# Patient Record
Sex: FEMALE | Race: WHITE | Employment: PART TIME | URBAN - NONMETROPOLITAN AREA
[De-identification: names, ages, dates, MRNs, and addresses within clinical notes are randomized per-mention and may not be internally consistent; named-entity substitution may affect disease eponyms.]

---

## 2017-01-04 ENCOUNTER — OFFICE VISIT (OUTPATIENT)
Dept: ORTHOPEDIC SURGERY | Age: 22
End: 2017-01-04

## 2017-01-04 VITALS
HEIGHT: 61 IN | HEART RATE: 72 BPM | WEIGHT: 209 LBS | BODY MASS INDEX: 39.46 KG/M2 | SYSTOLIC BLOOD PRESSURE: 142 MMHG | DIASTOLIC BLOOD PRESSURE: 82 MMHG

## 2017-01-04 DIAGNOSIS — M17.11 OSTEOARTHRITIS OF RIGHT PATELLOFEMORAL JOINT: Primary | ICD-10-CM

## 2017-01-04 PROCEDURE — 99213 OFFICE O/P EST LOW 20 MIN: CPT | Performed by: FAMILY MEDICINE

## 2017-01-04 RX ORDER — IBUPROFEN 800 MG/1
800 TABLET ORAL PRN
COMMUNITY
End: 2017-02-15

## 2017-01-05 ENCOUNTER — EVALUATION (OUTPATIENT)
Dept: PHYSICAL THERAPY | Age: 22
End: 2017-01-05

## 2017-01-05 DIAGNOSIS — M17.11 OSTEOARTHRITIS OF RIGHT PATELLOFEMORAL JOINT: Primary | ICD-10-CM

## 2017-01-05 PROCEDURE — 97161 PT EVAL LOW COMPLEX 20 MIN: CPT | Performed by: PHYSICAL THERAPIST

## 2017-01-05 ASSESSMENT — PAIN DESCRIPTION - PAIN TYPE: TYPE: CHRONIC PAIN

## 2017-01-05 ASSESSMENT — PAIN DESCRIPTION - PROGRESSION: CLINICAL_PROGRESSION: GRADUALLY WORSENING

## 2017-01-05 ASSESSMENT — PAIN DESCRIPTION - ORIENTATION: ORIENTATION: RIGHT

## 2017-01-05 ASSESSMENT — PAIN SCALES - GENERAL: PAINLEVEL_OUTOF10: 6

## 2017-01-05 ASSESSMENT — PAIN DESCRIPTION - LOCATION: LOCATION: KNEE

## 2017-01-05 ASSESSMENT — PAIN DESCRIPTION - FREQUENCY: FREQUENCY: CONTINUOUS

## 2017-01-05 ASSESSMENT — PAIN DESCRIPTION - DESCRIPTORS: DESCRIPTORS: ACHING;STABBING;SHARP

## 2017-01-08 PROCEDURE — G8979 MOBILITY GOAL STATUS: HCPCS | Performed by: PHYSICAL THERAPIST

## 2017-01-08 PROCEDURE — G8978 MOBILITY CURRENT STATUS: HCPCS | Performed by: PHYSICAL THERAPIST

## 2017-01-08 ASSESSMENT — PAIN SCALES - GENERAL: PAINLEVEL_OUTOF10: 6

## 2017-01-08 ASSESSMENT — PAIN DESCRIPTION - PAIN TYPE: TYPE: CHRONIC PAIN

## 2017-01-08 ASSESSMENT — PAIN DESCRIPTION - FREQUENCY: FREQUENCY: CONTINUOUS

## 2017-01-08 ASSESSMENT — PAIN DESCRIPTION - ORIENTATION: ORIENTATION: RIGHT

## 2017-01-08 ASSESSMENT — PAIN DESCRIPTION - DESCRIPTORS: DESCRIPTORS: ACHING;STABBING;SHARP

## 2017-01-08 ASSESSMENT — PAIN DESCRIPTION - LOCATION: LOCATION: KNEE

## 2017-01-09 ENCOUNTER — TREATMENT (OUTPATIENT)
Dept: PHYSICAL THERAPY | Age: 22
End: 2017-01-09

## 2017-01-09 DIAGNOSIS — M17.11 OSTEOARTHRITIS OF RIGHT PATELLOFEMORAL JOINT: Primary | ICD-10-CM

## 2017-01-09 PROCEDURE — 97014 ELECTRIC STIMULATION THERAPY: CPT

## 2017-01-09 PROCEDURE — 97110 THERAPEUTIC EXERCISES: CPT

## 2017-01-12 ENCOUNTER — TREATMENT (OUTPATIENT)
Dept: PHYSICAL THERAPY | Age: 22
End: 2017-01-12

## 2017-01-12 DIAGNOSIS — M17.11 OSTEOARTHRITIS OF RIGHT PATELLOFEMORAL JOINT: Primary | ICD-10-CM

## 2017-01-12 PROCEDURE — 97110 THERAPEUTIC EXERCISES: CPT

## 2017-01-12 PROCEDURE — 97014 ELECTRIC STIMULATION THERAPY: CPT

## 2017-01-16 ENCOUNTER — TREATMENT (OUTPATIENT)
Dept: PHYSICAL THERAPY | Age: 22
End: 2017-01-16

## 2017-01-16 DIAGNOSIS — M17.11 OSTEOARTHRITIS OF RIGHT PATELLOFEMORAL JOINT: Primary | ICD-10-CM

## 2017-01-16 PROCEDURE — 97014 ELECTRIC STIMULATION THERAPY: CPT

## 2017-01-16 PROCEDURE — 97110 THERAPEUTIC EXERCISES: CPT

## 2017-01-19 ENCOUNTER — TREATMENT (OUTPATIENT)
Dept: PHYSICAL THERAPY | Age: 22
End: 2017-01-19

## 2017-01-19 DIAGNOSIS — M17.11 OSTEOARTHRITIS OF RIGHT PATELLOFEMORAL JOINT: Primary | ICD-10-CM

## 2017-01-19 PROCEDURE — 97110 THERAPEUTIC EXERCISES: CPT

## 2017-01-19 PROCEDURE — 97014 ELECTRIC STIMULATION THERAPY: CPT

## 2017-01-23 ENCOUNTER — TREATMENT (OUTPATIENT)
Dept: PHYSICAL THERAPY | Age: 22
End: 2017-01-23

## 2017-01-23 DIAGNOSIS — M17.11 OSTEOARTHRITIS OF RIGHT PATELLOFEMORAL JOINT: Primary | ICD-10-CM

## 2017-01-23 PROCEDURE — 97110 THERAPEUTIC EXERCISES: CPT

## 2017-01-23 PROCEDURE — 97014 ELECTRIC STIMULATION THERAPY: CPT

## 2017-01-25 ENCOUNTER — TREATMENT (OUTPATIENT)
Dept: PHYSICAL THERAPY | Age: 22
End: 2017-01-25

## 2017-01-25 DIAGNOSIS — M17.11 OSTEOARTHRITIS OF RIGHT PATELLOFEMORAL JOINT: Primary | ICD-10-CM

## 2017-01-25 PROCEDURE — 97110 THERAPEUTIC EXERCISES: CPT

## 2017-01-25 PROCEDURE — 97014 ELECTRIC STIMULATION THERAPY: CPT

## 2017-01-31 ENCOUNTER — TREATMENT (OUTPATIENT)
Dept: PHYSICAL THERAPY | Age: 22
End: 2017-01-31

## 2017-01-31 DIAGNOSIS — M17.11 OSTEOARTHRITIS OF RIGHT PATELLOFEMORAL JOINT: Primary | ICD-10-CM

## 2017-01-31 PROCEDURE — 97110 THERAPEUTIC EXERCISES: CPT | Performed by: PHYSICAL THERAPIST

## 2017-01-31 PROCEDURE — G8978 MOBILITY CURRENT STATUS: HCPCS | Performed by: PHYSICAL THERAPIST

## 2017-02-02 ENCOUNTER — TREATMENT (OUTPATIENT)
Dept: PHYSICAL THERAPY | Age: 22
End: 2017-02-02

## 2017-02-02 DIAGNOSIS — M17.11 OSTEOARTHRITIS OF RIGHT PATELLOFEMORAL JOINT: Primary | ICD-10-CM

## 2017-02-02 PROCEDURE — 97014 ELECTRIC STIMULATION THERAPY: CPT

## 2017-02-02 PROCEDURE — 97110 THERAPEUTIC EXERCISES: CPT

## 2017-02-05 ENCOUNTER — HOSPITAL ENCOUNTER (EMERGENCY)
Age: 22
Discharge: HOME | End: 2017-02-05
Attending: EMERGENCY MEDICINE
Payer: COMMERCIAL

## 2017-02-05 ENCOUNTER — APPOINTMENT (OUTPATIENT)
Dept: GENERAL RADIOLOGY | Age: 22
End: 2017-02-05
Payer: COMMERCIAL

## 2017-02-05 VITALS
HEART RATE: 92 BPM | BODY MASS INDEX: 37.17 KG/M2 | HEIGHT: 62 IN | TEMPERATURE: 97.7 F | OXYGEN SATURATION: 100 % | SYSTOLIC BLOOD PRESSURE: 128 MMHG | RESPIRATION RATE: 16 BRPM | DIASTOLIC BLOOD PRESSURE: 62 MMHG | WEIGHT: 202 LBS

## 2017-02-05 DIAGNOSIS — M25.561 CHRONIC PAIN OF RIGHT KNEE: Primary | ICD-10-CM

## 2017-02-05 DIAGNOSIS — G89.29 CHRONIC PAIN OF RIGHT KNEE: Primary | ICD-10-CM

## 2017-02-05 LAB
CHP ED QC CHECK: YES
HCG(URINE) PREGNANCY TEST: NEGATIVE
PREGNANCY TEST URINE, POC: NEGATIVE

## 2017-02-05 PROCEDURE — 6360000002 HC RX W HCPCS: Performed by: EMERGENCY MEDICINE

## 2017-02-05 PROCEDURE — 73562 X-RAY EXAM OF KNEE 3: CPT

## 2017-02-05 PROCEDURE — 84703 CHORIONIC GONADOTROPIN ASSAY: CPT

## 2017-02-05 PROCEDURE — 6370000000 HC RX 637 (ALT 250 FOR IP): Performed by: EMERGENCY MEDICINE

## 2017-02-05 PROCEDURE — 96372 THER/PROPH/DIAG INJ SC/IM: CPT

## 2017-02-05 PROCEDURE — 73562 X-RAY EXAM OF KNEE 3: CPT | Performed by: RADIOLOGY

## 2017-02-05 PROCEDURE — 99284 EMERGENCY DEPT VISIT MOD MDM: CPT

## 2017-02-05 RX ORDER — KETOROLAC TROMETHAMINE 30 MG/ML
60 INJECTION, SOLUTION INTRAMUSCULAR; INTRAVENOUS ONCE
Status: COMPLETED | OUTPATIENT
Start: 2017-02-05 | End: 2017-02-05

## 2017-02-05 RX ORDER — OXYCODONE HYDROCHLORIDE AND ACETAMINOPHEN 5; 325 MG/1; MG/1
1 TABLET ORAL ONCE
Status: COMPLETED | OUTPATIENT
Start: 2017-02-05 | End: 2017-02-05

## 2017-02-05 RX ORDER — RANITIDINE 150 MG/1
150 TABLET ORAL 2 TIMES DAILY
COMMUNITY
End: 2017-11-10 | Stop reason: ALTCHOICE

## 2017-02-05 RX ADMIN — KETOROLAC TROMETHAMINE 60 MG: 30 INJECTION, SOLUTION INTRAMUSCULAR at 13:58

## 2017-02-05 RX ADMIN — OXYCODONE HYDROCHLORIDE AND ACETAMINOPHEN 1 TABLET: 5; 325 TABLET ORAL at 15:30

## 2017-02-05 ASSESSMENT — PAIN DESCRIPTION - PAIN TYPE
TYPE: CHRONIC PAIN
TYPE: CHRONIC PAIN

## 2017-02-05 ASSESSMENT — PAIN DESCRIPTION - ONSET: ONSET: ON-GOING

## 2017-02-05 ASSESSMENT — PAIN SCALES - GENERAL
PAINLEVEL_OUTOF10: 9
PAINLEVEL_OUTOF10: 9
PAINLEVEL_OUTOF10: 10

## 2017-02-05 ASSESSMENT — PAIN DESCRIPTION - ORIENTATION
ORIENTATION: RIGHT
ORIENTATION: RIGHT

## 2017-02-05 ASSESSMENT — PAIN DESCRIPTION - FREQUENCY: FREQUENCY: CONTINUOUS

## 2017-02-05 ASSESSMENT — PAIN DESCRIPTION - LOCATION
LOCATION: KNEE
LOCATION: KNEE

## 2017-02-05 ASSESSMENT — PAIN DESCRIPTION - DESCRIPTORS: DESCRIPTORS: SHARP

## 2017-02-05 ASSESSMENT — PAIN DESCRIPTION - PROGRESSION: CLINICAL_PROGRESSION: GRADUALLY WORSENING

## 2017-02-06 ENCOUNTER — OFFICE VISIT (OUTPATIENT)
Dept: PRIMARY CARE CLINIC | Age: 22
End: 2017-02-06

## 2017-02-06 ENCOUNTER — TELEPHONE (OUTPATIENT)
Dept: ORTHOPEDIC SURGERY | Age: 22
End: 2017-02-06

## 2017-02-06 VITALS
DIASTOLIC BLOOD PRESSURE: 74 MMHG | OXYGEN SATURATION: 98 % | BODY MASS INDEX: 38.57 KG/M2 | HEART RATE: 74 BPM | SYSTOLIC BLOOD PRESSURE: 128 MMHG | WEIGHT: 209.6 LBS | HEIGHT: 62 IN | TEMPERATURE: 98.3 F

## 2017-02-06 DIAGNOSIS — M25.561 ACUTE PAIN OF RIGHT KNEE: Primary | ICD-10-CM

## 2017-02-06 PROCEDURE — 99202 OFFICE O/P NEW SF 15 MIN: CPT | Performed by: NURSE PRACTITIONER

## 2017-02-06 ASSESSMENT — ENCOUNTER SYMPTOMS: RESPIRATORY NEGATIVE: 1

## 2017-02-07 ENCOUNTER — TREATMENT (OUTPATIENT)
Dept: PHYSICAL THERAPY | Age: 22
End: 2017-02-07

## 2017-02-07 PROCEDURE — 97014 ELECTRIC STIMULATION THERAPY: CPT | Performed by: PHYSICAL THERAPIST

## 2017-02-07 PROCEDURE — 97110 THERAPEUTIC EXERCISES: CPT | Performed by: PHYSICAL THERAPIST

## 2017-02-15 ENCOUNTER — OFFICE VISIT (OUTPATIENT)
Dept: ORTHOPEDIC SURGERY | Age: 22
End: 2017-02-15

## 2017-02-15 VITALS
DIASTOLIC BLOOD PRESSURE: 76 MMHG | HEIGHT: 62 IN | BODY MASS INDEX: 38.46 KG/M2 | SYSTOLIC BLOOD PRESSURE: 134 MMHG | HEART RATE: 72 BPM | WEIGHT: 209 LBS

## 2017-02-15 DIAGNOSIS — M17.11 OSTEOARTHRITIS OF RIGHT PATELLOFEMORAL JOINT: Primary | ICD-10-CM

## 2017-02-15 DIAGNOSIS — M23.51 RECURRENT KNEE INSTABILITY, RIGHT: ICD-10-CM

## 2017-02-15 PROCEDURE — 99213 OFFICE O/P EST LOW 20 MIN: CPT | Performed by: FAMILY MEDICINE

## 2017-02-15 RX ORDER — NAPROXEN 500 MG/1
500 TABLET ORAL 2 TIMES DAILY WITH MEALS
Qty: 60 TABLET | Refills: 3 | Status: SHIPPED | OUTPATIENT
Start: 2017-02-15 | End: 2017-11-10 | Stop reason: ALTCHOICE

## 2017-02-20 ENCOUNTER — TREATMENT (OUTPATIENT)
Dept: PHYSICAL THERAPY | Age: 22
End: 2017-02-20

## 2017-02-20 DIAGNOSIS — M17.11 OSTEOARTHRITIS OF RIGHT PATELLOFEMORAL JOINT: Primary | ICD-10-CM

## 2017-02-20 PROCEDURE — 97110 THERAPEUTIC EXERCISES: CPT | Performed by: PHYSICAL THERAPIST

## 2017-02-22 ENCOUNTER — TREATMENT (OUTPATIENT)
Dept: PHYSICAL THERAPY | Age: 22
End: 2017-02-22

## 2017-02-22 DIAGNOSIS — M17.11 OSTEOARTHRITIS OF RIGHT PATELLOFEMORAL JOINT: Primary | ICD-10-CM

## 2017-02-22 PROCEDURE — 97110 THERAPEUTIC EXERCISES: CPT | Performed by: PHYSICAL THERAPIST

## 2017-02-27 ENCOUNTER — TREATMENT (OUTPATIENT)
Dept: PHYSICAL THERAPY | Age: 22
End: 2017-02-27

## 2017-02-27 DIAGNOSIS — M17.11 OSTEOARTHRITIS OF RIGHT PATELLOFEMORAL JOINT: Primary | ICD-10-CM

## 2017-02-27 PROCEDURE — 97110 THERAPEUTIC EXERCISES: CPT | Performed by: PHYSICAL THERAPIST

## 2017-03-01 ENCOUNTER — TREATMENT (OUTPATIENT)
Dept: PHYSICAL THERAPY | Age: 22
End: 2017-03-01

## 2017-03-01 DIAGNOSIS — M17.11 OSTEOARTHRITIS OF RIGHT PATELLOFEMORAL JOINT: Primary | ICD-10-CM

## 2017-03-01 PROCEDURE — 97110 THERAPEUTIC EXERCISES: CPT | Performed by: PHYSICAL THERAPIST

## 2017-03-06 ENCOUNTER — TREATMENT (OUTPATIENT)
Dept: PHYSICAL THERAPY | Age: 22
End: 2017-03-06

## 2017-03-06 DIAGNOSIS — M17.11 OSTEOARTHRITIS OF RIGHT PATELLOFEMORAL JOINT: Primary | ICD-10-CM

## 2017-03-06 PROCEDURE — 97110 THERAPEUTIC EXERCISES: CPT | Performed by: PHYSICAL THERAPIST

## 2017-03-08 ENCOUNTER — TREATMENT (OUTPATIENT)
Dept: PHYSICAL THERAPY | Age: 22
End: 2017-03-08

## 2017-03-13 ENCOUNTER — TREATMENT (OUTPATIENT)
Dept: PHYSICAL THERAPY | Age: 22
End: 2017-03-13
Payer: COMMERCIAL

## 2017-03-13 DIAGNOSIS — M17.11 OSTEOARTHRITIS OF RIGHT PATELLOFEMORAL JOINT: Primary | ICD-10-CM

## 2017-03-13 PROCEDURE — 97110 THERAPEUTIC EXERCISES: CPT | Performed by: PHYSICAL THERAPIST

## 2017-03-15 ENCOUNTER — OFFICE VISIT (OUTPATIENT)
Dept: ORTHOPEDIC SURGERY | Age: 22
End: 2017-03-15
Payer: COMMERCIAL

## 2017-03-15 ENCOUNTER — TREATMENT (OUTPATIENT)
Dept: PHYSICAL THERAPY | Age: 22
End: 2017-03-15
Payer: COMMERCIAL

## 2017-03-15 VITALS
WEIGHT: 209 LBS | BODY MASS INDEX: 38.46 KG/M2 | DIASTOLIC BLOOD PRESSURE: 74 MMHG | HEIGHT: 62 IN | SYSTOLIC BLOOD PRESSURE: 134 MMHG | HEART RATE: 82 BPM

## 2017-03-15 DIAGNOSIS — F41.9 ANXIETY AND DEPRESSION: ICD-10-CM

## 2017-03-15 DIAGNOSIS — M17.11 OSTEOARTHRITIS OF RIGHT PATELLOFEMORAL JOINT: Primary | ICD-10-CM

## 2017-03-15 DIAGNOSIS — F32.A ANXIETY AND DEPRESSION: ICD-10-CM

## 2017-03-15 PROCEDURE — 99213 OFFICE O/P EST LOW 20 MIN: CPT | Performed by: FAMILY MEDICINE

## 2017-03-15 PROCEDURE — 97110 THERAPEUTIC EXERCISES: CPT | Performed by: PHYSICAL THERAPIST

## 2017-03-20 ENCOUNTER — TREATMENT (OUTPATIENT)
Dept: PHYSICAL THERAPY | Age: 22
End: 2017-03-20
Payer: COMMERCIAL

## 2017-03-20 DIAGNOSIS — M17.11 OSTEOARTHRITIS OF RIGHT PATELLOFEMORAL JOINT: Primary | ICD-10-CM

## 2017-03-20 PROCEDURE — 97110 THERAPEUTIC EXERCISES: CPT | Performed by: PHYSICAL THERAPIST

## 2017-06-19 PROCEDURE — G8980 MOBILITY D/C STATUS: HCPCS | Performed by: PHYSICAL THERAPIST

## 2017-11-10 ENCOUNTER — OFFICE VISIT (OUTPATIENT)
Dept: PRIMARY CARE CLINIC | Age: 22
End: 2017-11-10
Payer: COMMERCIAL

## 2017-11-10 VITALS
BODY MASS INDEX: 39.75 KG/M2 | HEIGHT: 62 IN | SYSTOLIC BLOOD PRESSURE: 125 MMHG | OXYGEN SATURATION: 98 % | WEIGHT: 216 LBS | TEMPERATURE: 99.9 F | DIASTOLIC BLOOD PRESSURE: 90 MMHG | HEART RATE: 102 BPM

## 2017-11-10 DIAGNOSIS — K52.9 GE (GASTROENTERITIS): Primary | ICD-10-CM

## 2017-11-10 PROCEDURE — G8417 CALC BMI ABV UP PARAM F/U: HCPCS | Performed by: NURSE PRACTITIONER

## 2017-11-10 PROCEDURE — 99213 OFFICE O/P EST LOW 20 MIN: CPT | Performed by: NURSE PRACTITIONER

## 2017-11-10 PROCEDURE — G8484 FLU IMMUNIZE NO ADMIN: HCPCS | Performed by: NURSE PRACTITIONER

## 2017-11-10 PROCEDURE — G8427 DOCREV CUR MEDS BY ELIG CLIN: HCPCS | Performed by: NURSE PRACTITIONER

## 2017-11-10 PROCEDURE — 1036F TOBACCO NON-USER: CPT | Performed by: NURSE PRACTITIONER

## 2017-11-10 RX ORDER — ALBUTEROL SULFATE 90 UG/1
AEROSOL, METERED RESPIRATORY (INHALATION) PRN
COMMUNITY
Start: 2016-03-31

## 2017-11-10 RX ORDER — ONDANSETRON 4 MG/1
4 TABLET, ORALLY DISINTEGRATING ORAL EVERY 8 HOURS PRN
Qty: 30 TABLET | Refills: 0 | Status: SHIPPED | OUTPATIENT
Start: 2017-11-10 | End: 2017-11-10 | Stop reason: CLARIF

## 2017-11-10 RX ORDER — ONDANSETRON 4 MG/1
4 TABLET, ORALLY DISINTEGRATING ORAL EVERY 8 HOURS PRN
Qty: 30 TABLET | Refills: 0 | Status: SHIPPED | OUTPATIENT
Start: 2017-11-10 | End: 2018-05-22 | Stop reason: ALTCHOICE

## 2017-11-10 RX ORDER — OXYBUTYNIN CHLORIDE 5 MG/1
5 TABLET ORAL DAILY
COMMUNITY
End: 2018-05-22 | Stop reason: ALTCHOICE

## 2017-11-10 ASSESSMENT — ENCOUNTER SYMPTOMS
CONSTIPATION: 0
COUGH: 0
WHEEZING: 0
DIARRHEA: 1
NAUSEA: 1
SHORTNESS OF BREATH: 0
VOMITING: 1

## 2017-11-10 NOTE — LETTER
[facility]  Brian Yan  Phone: [phone]  Fax: [phone]    Jack Garcia, PROVIDER [facility]        November 10, 2017     Patient: Carole Ugarte   YOB: 1995   Date of Visit: 11/10/2017       To Whom it May Concern:    Sommer Rivera was seen in my clinic on 11/10/2017. She may return to work on 11/12/2017. Please excuse her from work 11/10/2017-11/11/2017. If you have any questions or concerns, please don't hesitate to call.     Sincerely,         SCHEDULE, PROVIDER [address] [place] [address]

## 2018-05-22 ENCOUNTER — OFFICE VISIT (OUTPATIENT)
Dept: PRIMARY CARE CLINIC | Age: 23
End: 2018-05-22
Payer: COMMERCIAL

## 2018-05-22 ENCOUNTER — HOSPITAL ENCOUNTER (OUTPATIENT)
Dept: GENERAL RADIOLOGY | Age: 23
Discharge: HOME | End: 2018-05-24
Payer: COMMERCIAL

## 2018-05-22 VITALS
RESPIRATION RATE: 16 BRPM | WEIGHT: 213.6 LBS | SYSTOLIC BLOOD PRESSURE: 132 MMHG | DIASTOLIC BLOOD PRESSURE: 78 MMHG | HEIGHT: 63 IN | HEART RATE: 86 BPM | BODY MASS INDEX: 37.85 KG/M2

## 2018-05-22 DIAGNOSIS — M25.572 ACUTE LEFT ANKLE PAIN: ICD-10-CM

## 2018-05-22 DIAGNOSIS — M79.672 LEFT FOOT PAIN: ICD-10-CM

## 2018-05-22 DIAGNOSIS — S82.65XA CLOSED NONDISPLACED FRACTURE OF LATERAL MALLEOLUS OF LEFT FIBULA, INITIAL ENCOUNTER: Primary | ICD-10-CM

## 2018-05-22 PROBLEM — E66.9 OBESITY (BMI 30-39.9): Status: ACTIVE | Noted: 2018-05-22

## 2018-05-22 PROCEDURE — G8427 DOCREV CUR MEDS BY ELIG CLIN: HCPCS | Performed by: FAMILY MEDICINE

## 2018-05-22 PROCEDURE — G8417 CALC BMI ABV UP PARAM F/U: HCPCS | Performed by: FAMILY MEDICINE

## 2018-05-22 PROCEDURE — 1036F TOBACCO NON-USER: CPT | Performed by: FAMILY MEDICINE

## 2018-05-22 PROCEDURE — 99214 OFFICE O/P EST MOD 30 MIN: CPT | Performed by: FAMILY MEDICINE

## 2018-05-22 PROCEDURE — 73610 X-RAY EXAM OF ANKLE: CPT

## 2018-05-22 PROCEDURE — 73630 X-RAY EXAM OF FOOT: CPT

## 2018-05-22 RX ORDER — NORGESTIMATE AND ETHINYL ESTRADIOL 0.25-0.035
1 KIT ORAL
COMMUNITY
Start: 2018-05-04 | End: 2019-05-04

## 2018-05-22 RX ORDER — TRAMADOL HYDROCHLORIDE 50 MG/1
50 TABLET ORAL EVERY 6 HOURS PRN
Qty: 20 TABLET | Refills: 0 | Status: SHIPPED | OUTPATIENT
Start: 2018-05-22 | End: 2018-05-27

## 2018-05-22 ASSESSMENT — PATIENT HEALTH QUESTIONNAIRE - PHQ9
1. LITTLE INTEREST OR PLEASURE IN DOING THINGS: 0
SUM OF ALL RESPONSES TO PHQ QUESTIONS 1-9: 0
2. FEELING DOWN, DEPRESSED OR HOPELESS: 0
SUM OF ALL RESPONSES TO PHQ9 QUESTIONS 1 & 2: 0

## 2018-05-23 ENCOUNTER — TELEPHONE (OUTPATIENT)
Dept: PODIATRY | Age: 23
End: 2018-05-23

## 2018-05-23 ENCOUNTER — OFFICE VISIT (OUTPATIENT)
Dept: PODIATRY | Age: 23
End: 2018-05-23
Payer: COMMERCIAL

## 2018-05-23 VITALS
SYSTOLIC BLOOD PRESSURE: 134 MMHG | HEART RATE: 88 BPM | HEIGHT: 62 IN | DIASTOLIC BLOOD PRESSURE: 86 MMHG | RESPIRATION RATE: 24 BRPM | WEIGHT: 213 LBS | BODY MASS INDEX: 39.2 KG/M2

## 2018-05-23 DIAGNOSIS — S82.65XA CLOSED NONDISPLACED FRACTURE OF LATERAL MALLEOLUS OF LEFT FIBULA, INITIAL ENCOUNTER: Primary | ICD-10-CM

## 2018-05-23 PROCEDURE — G8427 DOCREV CUR MEDS BY ELIG CLIN: HCPCS | Performed by: PODIATRIST

## 2018-05-23 PROCEDURE — 1036F TOBACCO NON-USER: CPT | Performed by: PODIATRIST

## 2018-05-23 PROCEDURE — G8417 CALC BMI ABV UP PARAM F/U: HCPCS | Performed by: PODIATRIST

## 2018-05-23 PROCEDURE — 27786 TREATMENT OF ANKLE FRACTURE: CPT | Performed by: PODIATRIST

## 2018-05-23 PROCEDURE — L4360 PNEUMAT WALKING BOOT PRE CST: HCPCS | Performed by: PODIATRIST

## 2018-05-23 PROCEDURE — 99202 OFFICE O/P NEW SF 15 MIN: CPT | Performed by: PODIATRIST

## 2018-05-29 ENCOUNTER — TELEPHONE (OUTPATIENT)
Dept: PODIATRY | Age: 23
End: 2018-05-29

## 2018-05-30 ENCOUNTER — OFFICE VISIT (OUTPATIENT)
Dept: PODIATRY | Age: 23
End: 2018-05-30
Payer: COMMERCIAL

## 2018-05-30 ENCOUNTER — HOSPITAL ENCOUNTER (OUTPATIENT)
Dept: GENERAL RADIOLOGY | Age: 23
Discharge: HOME | End: 2018-06-01
Payer: COMMERCIAL

## 2018-05-30 VITALS — HEART RATE: 76 BPM | DIASTOLIC BLOOD PRESSURE: 74 MMHG | HEIGHT: 62 IN | SYSTOLIC BLOOD PRESSURE: 122 MMHG

## 2018-05-30 DIAGNOSIS — S82.65XD CLOSED NONDISPLACED FRACTURE OF LATERAL MALLEOLUS OF LEFT FIBULA WITH ROUTINE HEALING, SUBSEQUENT ENCOUNTER: ICD-10-CM

## 2018-05-30 DIAGNOSIS — M76.62 ACHILLES TENDINITIS OF LEFT LOWER EXTREMITY: Primary | ICD-10-CM

## 2018-05-30 PROCEDURE — 1036F TOBACCO NON-USER: CPT | Performed by: PODIATRIST

## 2018-05-30 PROCEDURE — 73610 X-RAY EXAM OF ANKLE: CPT

## 2018-05-30 PROCEDURE — 99213 OFFICE O/P EST LOW 20 MIN: CPT | Performed by: PODIATRIST

## 2018-05-30 PROCEDURE — G8417 CALC BMI ABV UP PARAM F/U: HCPCS | Performed by: PODIATRIST

## 2018-05-30 PROCEDURE — G8427 DOCREV CUR MEDS BY ELIG CLIN: HCPCS | Performed by: PODIATRIST

## 2018-05-30 RX ORDER — NAPROXEN 500 MG/1
500 TABLET ORAL 2 TIMES DAILY WITH MEALS
Qty: 40 TABLET | Refills: 0 | Status: SHIPPED | OUTPATIENT
Start: 2018-05-30

## 2018-06-13 ENCOUNTER — HOSPITAL ENCOUNTER (OUTPATIENT)
Dept: GENERAL RADIOLOGY | Age: 23
Discharge: HOME | End: 2018-06-15
Payer: COMMERCIAL

## 2018-06-13 ENCOUNTER — OFFICE VISIT (OUTPATIENT)
Dept: PODIATRY | Age: 23
End: 2018-06-13

## 2018-06-13 VITALS
SYSTOLIC BLOOD PRESSURE: 124 MMHG | HEART RATE: 100 BPM | WEIGHT: 216.4 LBS | RESPIRATION RATE: 20 BRPM | HEIGHT: 62 IN | BODY MASS INDEX: 39.82 KG/M2 | DIASTOLIC BLOOD PRESSURE: 74 MMHG

## 2018-06-13 DIAGNOSIS — S82.65XA CLOSED NONDISPLACED FRACTURE OF LATERAL MALLEOLUS OF LEFT FIBULA, INITIAL ENCOUNTER: ICD-10-CM

## 2018-06-13 DIAGNOSIS — S82.65XD CLOSED NONDISPLACED FRACTURE OF LATERAL MALLEOLUS OF LEFT FIBULA WITH ROUTINE HEALING, SUBSEQUENT ENCOUNTER: Primary | ICD-10-CM

## 2018-06-13 PROCEDURE — 99024 POSTOP FOLLOW-UP VISIT: CPT | Performed by: PODIATRIST

## 2018-06-13 PROCEDURE — 73610 X-RAY EXAM OF ANKLE: CPT

## 2018-06-18 ENCOUNTER — OFFICE VISIT (OUTPATIENT)
Dept: FAMILY MEDICINE CLINIC | Age: 23
End: 2018-06-18
Payer: COMMERCIAL

## 2018-06-18 VITALS
HEIGHT: 62 IN | SYSTOLIC BLOOD PRESSURE: 130 MMHG | BODY MASS INDEX: 38.64 KG/M2 | HEART RATE: 95 BPM | WEIGHT: 210 LBS | DIASTOLIC BLOOD PRESSURE: 82 MMHG | OXYGEN SATURATION: 98 %

## 2018-06-18 DIAGNOSIS — Z00.00 ROUTINE HEALTH MAINTENANCE: Primary | ICD-10-CM

## 2018-06-18 DIAGNOSIS — Z76.89 ENCOUNTER TO ESTABLISH CARE: ICD-10-CM

## 2018-06-18 DIAGNOSIS — G43.909 MIGRAINE WITHOUT STATUS MIGRAINOSUS, NOT INTRACTABLE, UNSPECIFIED MIGRAINE TYPE: ICD-10-CM

## 2018-06-18 PROCEDURE — 99385 PREV VISIT NEW AGE 18-39: CPT | Performed by: NURSE PRACTITIONER

## 2018-06-18 ASSESSMENT — ENCOUNTER SYMPTOMS
SHORTNESS OF BREATH: 0
COUGH: 0
DIARRHEA: 0
VOMITING: 0
RESPIRATORY NEGATIVE: 1
CHEST TIGHTNESS: 0
SINUS PRESSURE: 0
ABDOMINAL PAIN: 0
NAUSEA: 0
ALLERGIC/IMMUNOLOGIC NEGATIVE: 1
EYES NEGATIVE: 1
GASTROINTESTINAL NEGATIVE: 1
CONSTIPATION: 0
TROUBLE SWALLOWING: 0

## 2018-06-18 ASSESSMENT — PATIENT HEALTH QUESTIONNAIRE - PHQ9
SUM OF ALL RESPONSES TO PHQ9 QUESTIONS 1 & 2: 0
SUM OF ALL RESPONSES TO PHQ QUESTIONS 1-9: 0
2. FEELING DOWN, DEPRESSED OR HOPELESS: 0
1. LITTLE INTEREST OR PLEASURE IN DOING THINGS: 0

## 2018-06-26 ENCOUNTER — OFFICE VISIT (OUTPATIENT)
Dept: PRIMARY CARE CLINIC | Age: 23
End: 2018-06-26
Payer: COMMERCIAL

## 2018-06-26 VITALS
HEART RATE: 82 BPM | WEIGHT: 217 LBS | HEIGHT: 62 IN | BODY MASS INDEX: 39.93 KG/M2 | DIASTOLIC BLOOD PRESSURE: 88 MMHG | OXYGEN SATURATION: 98 % | SYSTOLIC BLOOD PRESSURE: 138 MMHG

## 2018-06-26 DIAGNOSIS — S29.012A MUSCLE STRAIN OF RIGHT UPPER BACK, INITIAL ENCOUNTER: Primary | ICD-10-CM

## 2018-06-26 PROCEDURE — 1036F TOBACCO NON-USER: CPT | Performed by: NURSE PRACTITIONER

## 2018-06-26 PROCEDURE — G8427 DOCREV CUR MEDS BY ELIG CLIN: HCPCS | Performed by: NURSE PRACTITIONER

## 2018-06-26 PROCEDURE — G8417 CALC BMI ABV UP PARAM F/U: HCPCS | Performed by: NURSE PRACTITIONER

## 2018-06-26 PROCEDURE — 99213 OFFICE O/P EST LOW 20 MIN: CPT | Performed by: NURSE PRACTITIONER

## 2018-06-26 RX ORDER — CYCLOBENZAPRINE HCL 10 MG
10 TABLET ORAL 3 TIMES DAILY PRN
Qty: 20 TABLET | Refills: 0 | Status: SHIPPED | OUTPATIENT
Start: 2018-06-26 | End: 2018-07-06

## 2018-06-26 ASSESSMENT — PATIENT HEALTH QUESTIONNAIRE - PHQ9
SUM OF ALL RESPONSES TO PHQ QUESTIONS 1-9: 0
1. LITTLE INTEREST OR PLEASURE IN DOING THINGS: 0
SUM OF ALL RESPONSES TO PHQ9 QUESTIONS 1 & 2: 0
2. FEELING DOWN, DEPRESSED OR HOPELESS: 0

## 2018-06-26 ASSESSMENT — ENCOUNTER SYMPTOMS: RESPIRATORY NEGATIVE: 1

## 2018-06-28 ENCOUNTER — TELEPHONE (OUTPATIENT)
Dept: PODIATRY | Age: 23
End: 2018-06-28

## 2018-06-28 NOTE — TELEPHONE ENCOUNTER
6/28/2018 patient saw Dr Tyree Abreu 6/13/2018 for Closed nondisplaced fracture of lateral malleolus of left fibula with routine healing, subsequent encounter , patient is scheduled to see Dr Tyree Abreu again 7/5/2018. Patient called concerned that she has not been able to were tennis shoes as Dr Tyree Abreu had requested. Patient states she feels pressure on her ankle, ankle \"pops\". Please advise.   If she does not answer her phone you can leave a detailed message  [phone]

## 2018-07-05 ENCOUNTER — HOSPITAL ENCOUNTER (OUTPATIENT)
Dept: GENERAL RADIOLOGY | Age: 23
Discharge: HOME | End: 2018-07-07
Payer: COMMERCIAL

## 2018-07-05 ENCOUNTER — OFFICE VISIT (OUTPATIENT)
Dept: PODIATRY | Age: 23
End: 2018-07-05
Payer: COMMERCIAL

## 2018-07-05 VITALS
HEIGHT: 62 IN | SYSTOLIC BLOOD PRESSURE: 112 MMHG | DIASTOLIC BLOOD PRESSURE: 74 MMHG | WEIGHT: 219 LBS | HEART RATE: 68 BPM | BODY MASS INDEX: 40.3 KG/M2

## 2018-07-05 DIAGNOSIS — M76.62 ACHILLES TENDINITIS OF LEFT LOWER EXTREMITY: Primary | ICD-10-CM

## 2018-07-05 DIAGNOSIS — S82.65XD CLOSED NONDISPLACED FRACTURE OF LATERAL MALLEOLUS OF LEFT FIBULA WITH ROUTINE HEALING, SUBSEQUENT ENCOUNTER: ICD-10-CM

## 2018-07-05 PROCEDURE — 99213 OFFICE O/P EST LOW 20 MIN: CPT | Performed by: PODIATRIST

## 2018-07-05 PROCEDURE — 73630 X-RAY EXAM OF FOOT: CPT

## 2018-07-05 PROCEDURE — G8417 CALC BMI ABV UP PARAM F/U: HCPCS | Performed by: PODIATRIST

## 2018-07-05 PROCEDURE — 1036F TOBACCO NON-USER: CPT | Performed by: PODIATRIST

## 2018-07-05 PROCEDURE — G8427 DOCREV CUR MEDS BY ELIG CLIN: HCPCS | Performed by: PODIATRIST

## 2018-07-05 RX ORDER — METHYLPREDNISOLONE 4 MG/1
TABLET ORAL
Qty: 1 KIT | Refills: 0 | Status: SHIPPED | OUTPATIENT
Start: 2018-07-05 | End: 2018-08-10 | Stop reason: ALTCHOICE

## 2018-07-05 NOTE — PROGRESS NOTES
Subjective:  Charlene Goss is a 25 y.o. female who presents to the office today complaining of pain on the Left ankle. Symptoms beganweek(s) ago. Patient relates pain is Present. Pain is rated 6 out of 10 and is described as waxing and waning, moderate. Pt has most pain behind ankle, feels numb when she is on it too much. Treatments prior to today's visit include: cam walker. Currently denies F/C/N/V. Allergies   Allergen Reactions    Latex Hives    Cobalt     Nickel Hives    Nitrous Oxide     Seasonal      Dust, grass, trees, cats, dogs, [place]       Past Medical History:   Diagnosis Date    DJD (degenerative joint disease)     Migraine     Reflux        Prior to Admission medications    Medication Sig Start Date End Date Taking? Authorizing Provider   methylPREDNISolone (MEDROL DOSEPACK) 4 MG tablet Take by mouth. 7/5/18  Yes Sirisha Fuller DPM   cyclobenzaprine (FLEXERIL) 10 MG tablet Take 1 tablet by mouth 3 times daily as needed for Muscle spasms 6/26/18 7/6/18 Yes Cloria Faster, APRN - CNP   naproxen (NAPROSYN) 500 MG tablet Take 1 tablet by mouth 2 times daily (with meals) 5/30/18  Yes Sirisha Fuller DPM   norgestimate-ethinyl estradiol (ORTHO-CYCLEN) 0.25-35 MG-MCG per tablet Take 1 tablet by mouth 5/4/18 5/4/19 Yes Historical Provider, MD   Misc. Devices ([address]) MISC Use as needed until pain has improved.  5/22/18  Yes Sharon Alarcon MD   albuterol sulfate HFA (VENTOLIN HFA) 108 (90 Base) MCG/ACT inhaler as needed 3/31/16  Yes Historical Provider, MD       Past Surgical History:   Procedure Laterality Date    KNEE ARTHROSCOPY Right 11/2015    Dr Shiv Vega EXTRACTION         Family History   Problem Relation Age of Onset    High Cholesterol Mother         on medications    Other Sister         djd    Migraines Sister     Heart Disease Maternal Grandmother     Other Paternal Grandmother         flu       Social History   Substance Use Topics    Smoking status: Never Smoker    Smokeless tobacco: Never Used    Alcohol use No       Review of Systems: All 12 systems reviewed and pertinent positives noted above. Lower Extremity Physical Examination:     Vitals:   Vitals:    07/05/18 1114   BP: 112/74   Pulse: 68     General: AAO x 3 in NAD. Vascular: DP and PT pulses palpable 2/4, bilateral.  CFT <3 seconds, bilateral.  Hair growth present to the level of the digits, bilateral.  Edema absent, bilateral.  Varicosities absent, bilateral. Erythema absent, bilateral. Distal Rubor absent bilateral.  Temperature within normal limits bilateral. Hyperpigmentation absent bilateral. No atrophic skin. Neurological: Sensation intact to light touch to level of digits, bilateral.  Protective sensation intact 10/10 sites via 5.07/10g Milton-Aman Monofilament, bilateral.  negative Tinel's, bilateral.  negative Valleix sign, bilateral.  Vibratory intact bilateral.  Reflexes Decreased bilateral.  Paresthesias negative. Dysthesias negative. Sharp/dull intact bilateral.    Musculoskeletal: Muscle strength 5/5, Bilateral.  Pain with strength testing is absent. Pain present upon palpation of distal achilles insertion and ATFL area, no pain distal fibula, Left.  within normal limits medial longitudinal arch, Bilateral.  Ankle ROM decreased,Bilateral.  1st MPJ ROM within normal limits, Bilateral.  Dorsally contracted digits absent digits none, Bilateral. Other foot deformities negative anterior drawer sign, no signs of ankle instability. Integument: Warm, dry, supple, bilateral.  Open lesion absent, bilateral.  Interdigital maceration absent to web spaces bilateral.  Nails within normal limits. Fissures absent, bilateral. Hyperkeratotic tissue is absent. Asessment: Patient is a 25 y.o. female with:    Diagnosis Orders   1. Achilles tendinitis of left lower extremity         Plan: Patient examined and evaluated.   Current condition and treatment

## 2018-07-05 NOTE — LETTER
[address] [facility]  [facility]  Phone: [phone]  Fax: [phone], DPH        July 5, 2018     Patient: Bebeto Rojo   YOB: 1995   Date of Visit: 7/5/2018       To Whom It May Concern: It is my medical opinion that Silverio Hameed may return to work on 7/6/18. She must wear her cam-walker at all times and be allowed to use a stool to sit as needed along with her crutches as needed. .    If you have any questions or concerns, please don't hesitate to call.     Sincerely,        Fabienne Baez DPM

## 2018-07-25 ENCOUNTER — TELEPHONE (OUTPATIENT)
Dept: PODIATRY | Age: 23
End: 2018-07-25

## 2018-07-25 DIAGNOSIS — M76.62 ACHILLES TENDINITIS OF LEFT LOWER EXTREMITY: Primary | ICD-10-CM

## 2018-07-25 NOTE — TELEPHONE ENCOUNTER
Patient is still having left ankle pain and numbness. She was told to call back and schedule MRI if not better. Please advise.

## 2018-08-03 ENCOUNTER — HOSPITAL ENCOUNTER (OUTPATIENT)
Dept: MRI IMAGING | Age: 23
Discharge: HOME | End: 2018-08-05
Payer: COMMERCIAL

## 2018-08-03 DIAGNOSIS — M76.62 ACHILLES TENDINITIS OF LEFT LOWER EXTREMITY: ICD-10-CM

## 2018-08-03 PROCEDURE — 73721 MRI JNT OF LWR EXTRE W/O DYE: CPT

## 2018-08-10 ENCOUNTER — OFFICE VISIT (OUTPATIENT)
Dept: PODIATRY | Age: 23
End: 2018-08-10
Payer: COMMERCIAL

## 2018-08-10 VITALS
DIASTOLIC BLOOD PRESSURE: 80 MMHG | HEIGHT: 62 IN | HEART RATE: 80 BPM | WEIGHT: 222.8 LBS | RESPIRATION RATE: 20 BRPM | SYSTOLIC BLOOD PRESSURE: 124 MMHG | BODY MASS INDEX: 41 KG/M2

## 2018-08-10 DIAGNOSIS — M79.2 NEURITIS: ICD-10-CM

## 2018-08-10 DIAGNOSIS — M76.72 PERONEAL TENDINITIS OF LOWER LEG, LEFT: Primary | ICD-10-CM

## 2018-08-10 DIAGNOSIS — M76.62 ACHILLES TENDINITIS, LEFT LEG: ICD-10-CM

## 2018-08-10 PROCEDURE — G8427 DOCREV CUR MEDS BY ELIG CLIN: HCPCS | Performed by: PODIATRIST

## 2018-08-10 PROCEDURE — 1036F TOBACCO NON-USER: CPT | Performed by: PODIATRIST

## 2018-08-10 PROCEDURE — G8417 CALC BMI ABV UP PARAM F/U: HCPCS | Performed by: PODIATRIST

## 2018-08-10 PROCEDURE — 99213 OFFICE O/P EST LOW 20 MIN: CPT | Performed by: PODIATRIST

## 2018-08-10 RX ORDER — GABAPENTIN 300 MG/1
300 CAPSULE ORAL 2 TIMES DAILY
Qty: 60 CAPSULE | Refills: 0 | Status: SHIPPED | OUTPATIENT
Start: 2018-08-10 | End: 2018-09-09

## 2018-08-10 NOTE — PROGRESS NOTES
CFT <3 seconds, bilateral.  Hair growth present to the level of the digits, bilateral.  Edema Present, bilateral.  Ecchymosis is Absent . Varicosities absent, bilateral. Erythema absent, bilateral. Distal Rubor absent bilateral.  Temperature within normal limits bilateral. Hyperpigmentation absent bilateral. No atrophic skin. Neurological: Sensation intact to light touch to level of digits, bilateral.  Protective sensation intact 10/10 sites via 5.07/10g Gilmer-Aman Monofilament, bilateral.  negative Tinel's, bilateral.  negative Valleix sign, bilateral.  Vibratory intact bilateral.  Reflexes Decreased bilateral.  Paresthesias positive  Dysthesias positive. Sharp/dull intact bilateral.  Musculoskeletal: Muscle strength 5/5, Bilateral.  Pain present upon palpation of achilles insertion and peroneal tendon behind lateral malleolus extending down  Left. within normal limits medial longitudinal arch, Bilateral.  Ankle ROM within normal limits,Bilateral.  1st MPJ ROM within normal limits, Bilateral.  Dorsally contracted digits absent digits none Bilateral. Other foot deformities none. Integument: Warm, dry, supple, bilateral.  Open lesion absent, bilateral.  Interdigital maceration absent to web spaces bilateral.  Nails within normal limits. Fissures absent, bilateral. Hyperkeratotic tissue is absent. MRI:see report    Assessment:   Diagnosis Orders   1. Peroneal tendinitis of lower leg, left  Ambulatory referral to Physical Therapy   2. Achilles tendinitis, left leg  Ambulatory referral to Physical Therapy   3. Neuritis  Ambulatory referral to Physical Therapy       Plan:  Patient condition was discussed and all questions answered. MRIs reviewed with the pt in detail. All questions answered. Orders Placed This Encounter   Medications    gabapentin (NEURONTIN) 300 MG capsule     Sig: Take 1 capsule by mouth 2 times daily for 30 days. .     Dispense:  60 capsule     Refill:  0     Orders Placed This Encounter   Procedures    Ambulatory referral to Physical Therapy     Referral Priority:   Routine     Referral Type:   Eval and Treat     Referral Reason:   Specialty Services Required     Requested Specialty:   Physical Therapy     Number of Visits Requested:   1   pt should worl out of cam walker  Rx neurontin. Patient will take as directed. Risks and complications discussed with patient and also advised to read drug insert from pharmacy for further information.   Patient will be seen back in 3 week(s) with no need for new x-rays 1st.

## 2018-08-22 ENCOUNTER — HOSPITAL ENCOUNTER (OUTPATIENT)
Dept: PHYSICAL THERAPY | Age: 23
Setting detail: THERAPIES SERIES
Discharge: HOME | End: 2018-08-22
Payer: COMMERCIAL

## 2018-08-22 PROCEDURE — G8979 MOBILITY GOAL STATUS: HCPCS | Performed by: PHYSICAL THERAPIST

## 2018-08-22 PROCEDURE — 97110 THERAPEUTIC EXERCISES: CPT | Performed by: PHYSICAL THERAPIST

## 2018-08-22 PROCEDURE — G8978 MOBILITY CURRENT STATUS: HCPCS | Performed by: PHYSICAL THERAPIST

## 2018-08-22 PROCEDURE — 97162 PT EVAL MOD COMPLEX 30 MIN: CPT | Performed by: PHYSICAL THERAPIST

## 2018-08-22 ASSESSMENT — PAIN DESCRIPTION - PROGRESSION: CLINICAL_PROGRESSION: NOT CHANGED

## 2018-08-22 ASSESSMENT — PAIN SCALES - GENERAL: PAINLEVEL_OUTOF10: 7

## 2018-08-22 ASSESSMENT — PAIN DESCRIPTION - LOCATION: LOCATION: ANKLE;FOOT

## 2018-08-22 ASSESSMENT — PAIN DESCRIPTION - ONSET: ONSET: ON-GOING

## 2018-08-22 ASSESSMENT — PAIN DESCRIPTION - PAIN TYPE: TYPE: CHRONIC PAIN

## 2018-08-22 ASSESSMENT — PAIN DESCRIPTION - FREQUENCY: FREQUENCY: INTERMITTENT

## 2018-08-22 NOTE — PLAN OF CARE
[facility]    [x] [place]  Phone: [phone]  Fax: [phone]      [] [place]  Phone: [phone]  Fax: [phone]        To: Referring Practitioner: Devonte Hermosillo DPM      Patient: Suzy Watkins  : 1995   MRN: [id number]  Evaluation Date: 2018      Diagnosis Information:  · Diagnosis: Peroneal Tendonitis of L leg, Achilles tendonitis of L leg,    · Treatment Diagnosis: left ankle pain, weakness     Physical Therapy Certification Form  Dear Dr. Sydni Limon  The following patient has been evaluated for physical therapy services and for therapy to continue, insurance requires monthly physician review of the treatment plan. Please review the attached evaluation and/or summary of the patient's plan of care, and verify that you agree therapy should continue by signing the attached document and sending it back to our office. Plan of Care/Treatment to date:  [x] Therapeutic Exercise    [x] Modalities:  [x] Therapeutic Activity     [x] Ultrasound  [x] Electrical Stimulation  [x] Gait Training      [] Cervical Traction [] Lumbar Traction  [x] Neuromuscular Re-education    [x] Cold/hotpack [] Iontophoresis   [x] Instruction in HEP     Other:  [x] Manual Therapy/IDN      []             [] Aquatic Therapy      []           ? Goals:  Short term goals  Time Frame for Short term goals: 2 weeks  Short term goal 1: Initiate HEP  Short term goal 2: Decrease L ankle pain to <5/10 for improved ease with ADL and ambulation  Short term goal 3: Increase L ankle AROM dorsiflexion to 5 degrees for improved ease with ADL and ambulation    Long term goals  Time Frame for Long term goals : 4 weeks  Long term goal 1: Indep with HEP  Long term goal 2: Decrease L ankle pain to <3/10 for improved ease with ADL and ambulation  Long term goal 3:  Increase L ankle AROM/strength  to [facility] for improved ease with ADL and ambulation  Long term goal 4: Pt to amb >500' and tolerate full work shift without CAM boot for improved ease and independence with home/community/work duties  Long term goal 5: LEFS score <20% disabled for return to previous level of function    Frequency/Duration: 8/22/18 - 9/22/18  # Days per week: [] 1 day # Weeks: [] 1 week [] 5 weeks     [x] 2 days? [] 2 weeks [] 6 weeks     [] 3 days   [] 3 weeks [] 7 weeks     [] 4 days   [x] 4 weeks [] 8 weeks    Rehab Potential: [] Excellent [x] Good [] Fair  [] Poor     Electronically signed by:  Brigido Hernández, PT, DPT    If you have any questions or concerns, please don't hesitate to call.   Thank you for your referral.      Physician Signature:________________________________Date:__________________  By signing above, therapists plan is approved by physician

## 2018-08-22 NOTE — PROGRESS NOTES
Physical Therapy  Initial Assessment  Date: 2018  Patient Name: Bebeto Rjoo  MRN: [id number]  : 1995     Treatment Diagnosis: left ankle pain, weakness    Restrictions       Subjective   General  Chart Reviewed: Yes  Patient assessed for rehabilitation services?: Yes  Response To Previous Treatment: Not applicable  Family / Caregiver Present: No  Referring Practitioner: Sidney Kent DPM  Referral Date : 08/10/18  Diagnosis: Peroneal Tendonitis of L leg, Achilles tendonitis of L leg,   Follows Commands: Within Functional Limits  General Comment  Comments: See MRI results for specifics with MRI imaging. PT Visit Information  Onset Date: 08/10/18  PT Insurance Information: [employer]  Subjective  Subjective: \"In May I fell and broke my ankle and have been having problems ever since then. The fracture is healed, but I still have a lot of difficulty. I had an MRi about 2 weeks ago which showed some strains of tendons and ligmanets or something like that. He said everything is really inflamed. When I am at work is when I get the most pain. I do have a stool I can use, but I try to stand. I get an ache and sharpness and sometimes if I don't sit down then things go completely numb from ankle down. \"  Pain Screening  Patient Currently in Pain: Yes  Pain Assessment  Pain Assessment: 0-10  Pain Level: 7 (10/10 at worst)  Pain Type: Chronic pain  Pain Location: Ankle; Foot  Pain Orientation: Left;Posterior; Outer; Lower  Pain Radiating Towards: through lateral portion of foot and ankle, occasionally through posterior ankle  Pain Descriptors: Reza [place]; Throbbing;Burning;Numbness;Tingling  Pain Frequency: Intermittent  Pain Onset: On-going  Clinical Progression: Not changed  Effect of Pain on Daily Activities: decreased ease with ADL, ambulation, work duties  Patient's Stated Pain Goal: No pain  Pain Intervention(s): Medication (see eMar);Repositioned;Rest;Cold applied  Vital Signs  Patient Currently in Pain: Yes    Vision/Hearing       Orientation  Orientation  Overall Orientation Status: Within Normal Limits    Social/Functional History  Social/Functional History  Lives With: Significant other  Type of Home: Apartment  Home Layout: Two level  Home Access: Stairs to enter with rails (14-15)  Entrance Stairs - Number of Steps: 14-15 steps  Entrance Stairs - Rails: Right  Bathroom Shower/Tub: Tub/Shower unit  Bathroom Toilet: Standard  Home Equipment: Crutches (has crutches if she needs them, but mostly just using the CAM walker)  Receives Help From: Friend(s)  ADL Assistance: Independent  Homemaking Assistance: Independent  Homemaking Responsibilities: Yes  Meal Prep Responsibility: Primary  Laundry Responsibility: Primary  Cleaning Responsibility: Primary  Shopping Responsibility: Primary  Ambulation Assistance: Independent  Transfer Assistance: Independent  Active : No  Patient's  Info: boyfriend  Occupation: Part time employment  Type of occupation: /[employer] 8 hour shifts, on feet, but able to sit on stool  Leisure & Hobbies: shopping, makeup  Objective     Observation/Palpation  Observation: patient amb into clinic indep with CAM boot on left foot, mild antalgia with CAM on    PROM RLE (degrees)  RLE PROM: WNL  AROM RLE (degrees)  RLE AROM: WNL  PROM LLE (degrees)  LLE PROM: WFL  AROM LLE (degrees)  LLE AROM : Exceptions  L Ankle Dorsiflexion 0-20: to neutral  L Ankle Plantar Flexion 0-45: WNL  L Ankle Forefoot Inversion 0-40: 25  L Ankle Forefoot Eversion 0-20: 10    Strength RLE  Strength RLE: WFL  Strength LLE  Strength LLE: Exception  L Ankle Dorsiflexion: 3-/5  L Ankle Plantar Flexion: 3+/5  L Ankle Inversion: 3+/5  L Ankle Eversion: 3+/5     Additional Measures  Girth: Figure 8 Left ankle: 57cm  Special Tests: - Squeeze test           Ambulation  Ambulation?: Yes  Ambulation 1  Surface: level tile  Device: No Device  Other Apparatus:  (CAM boot)  Assistance:

## 2018-08-27 ENCOUNTER — HOSPITAL ENCOUNTER (OUTPATIENT)
Dept: PHYSICAL THERAPY | Age: 23
Setting detail: THERAPIES SERIES
Discharge: HOME | End: 2018-08-27
Payer: COMMERCIAL

## 2018-08-27 PROCEDURE — 97035 APP MDLTY 1+ULTRASOUND EA 15: CPT | Performed by: PHYSICAL THERAPIST

## 2018-08-27 PROCEDURE — 97110 THERAPEUTIC EXERCISES: CPT | Performed by: PHYSICAL THERAPIST

## 2018-08-27 NOTE — FLOWSHEET NOTE
Physical Therapy Daily Treatment Note    Date:  2018    Patient Name:  Luzma Olivier    :  1995  MRN: [id number]  Restrictions/Precautions:     Medical/Treatment Diagnosis Information:   · Diagnosis: Peroneal Tendonitis of L leg, Achilles tendonitis of L leg,   · Treatment Diagnosis: left ankle pain, weakness  Insurance/Certification information:  PT Insurance Information: [email or web address]  Physician Information:  Referring Practitioner: Zenaida Hsieh DPM  Plan of care signed (Y/N):  Y  Visit# / total visits:  2/10  Pain level: 7/10     G-Code (if applicable):      Date G-Code Applied:  18  PT G-Codes  Functional Assessment Tool Used: LEFS  Score: 40/80 = 50% disabled  Functional Limitation: Mobility: Walking and moving around  Mobility: Walking and Moving Around Current Status (): At least 40 percent but less than 60 percent impaired, limited or restricted  Mobility: Walking and Moving Around Goal Status (): At least 1 percent but less than 20 percent impaired, limited or restricted    Time In: 10:02   Time Out: 10:57    Progress Note: []  Yes  [x]  No  Next due by: Visit #10 Or by 18     Subjective:   \"My ankle doesn't necessarily feel painful today, but it feels really tight and pressure. I haven't been on it much yet today. I I try to stand at work for as much as I can, but after about an hour I have to sit down because it starts to hurt a lot. \"    Objective:   Observation:   Test measurements:    AROM L ankle   DF: 5°   PF: 55°  INV: 30°  EV: 10°    Exercises:   Exercise/Equipment Resistance/Repetitions Other comments   Ankle 4 way 20x    Ankle Circles 20x    Ankle ABC 1x A-Z    Toe Curl/Ext 20x    Seated HR/TR 15x    Seated BAPS 15x each Level 2   Towel Swipes 15x    Skateboard 1' each Fwd/back; side to side; circles        Belt DF Stretch 3 x 30\" gentle                       [x] Provided verbal/tactile cueing for activities related to strengthening, flexibility, endurance, ROM.

## 2018-09-05 ENCOUNTER — HOSPITAL ENCOUNTER (OUTPATIENT)
Dept: PHYSICAL THERAPY | Age: 23
Setting detail: THERAPIES SERIES
Discharge: HOME | End: 2018-09-05
Payer: COMMERCIAL

## 2018-09-05 PROCEDURE — 97110 THERAPEUTIC EXERCISES: CPT | Performed by: PHYSICAL THERAPIST

## 2018-09-05 PROCEDURE — 97150 GROUP THERAPEUTIC PROCEDURES: CPT | Performed by: PHYSICAL THERAPIST

## 2018-09-05 NOTE — FLOWSHEET NOTE
Physical Therapy Daily Treatment Note    Date:  2018    Patient Name:  Luzma Olivier    :  1995  MRN: [id number]  Restrictions/Precautions:     Medical/Treatment Diagnosis Information:   · Diagnosis: Peroneal Tendonitis of L leg, Achilles tendonitis of L leg,   · Treatment Diagnosis: left ankle pain, weakness  Insurance/Certification information:  PT Insurance Information: [employer]  Physician Information:  Referring Practitioner: Zenaida Hsieh DPM  Plan of care signed (Y/N):  Y  Visit# / total visits:  3/10  Pain level: 7/10     G-Code (if applicable):      Date G-Code Applied:  18  PT G-Codes  Functional Assessment Tool Used: LEFS  Score: 40/80 = 50% disabled  Functional Limitation: Mobility: Walking and moving around  Mobility: Walking and Moving Around Current Status (): At least 40 percent but less than 60 percent impaired, limited or restricted  Mobility: Walking and Moving Around Goal Status (): At least 1 percent but less than 20 percent impaired, limited or restricted    Time In: 10:01   Time Out: 10:56    Progress Note: []  Yes  [x]  No  Next due by: Visit #10 Or by 18     Subjective: \"On Friday I wasn't wearing my boot and I had to grab my dog outside and when I stepped down I missed a step and hit my left foot on the lower step. The foot and ankle were twitching and had some numbness and a little swelling in the foot for the rest of the night. It doesn't hurt much on days that I don't need to work, but when I work it hurts really bad at the end of the shift. \"    Objective:   Observation:   Test measurements:    AROM L ankle   DF: 8°   PF: 55°  INV: 30°  EV: 10°    Exercises:   Exercise/Equipment Resistance/Repetitions Other comments   Ankle 4 way 20x    Ankle Circles 20x    Ankle ABC 1x A-Z    Toe Curl/Ext 20x    Seated HR/TR 15x    Seated BAPS 15x each Level 2   Towel Swipes 15x    Towel Scrunches 3# 2x length of towel    Skateboard 1' each Fwd/back; side to side;

## 2018-09-07 ENCOUNTER — HOSPITAL ENCOUNTER (OUTPATIENT)
Dept: PHYSICAL THERAPY | Age: 23
Setting detail: THERAPIES SERIES
Discharge: HOME | End: 2018-09-07
Payer: COMMERCIAL

## 2018-09-07 PROCEDURE — 97110 THERAPEUTIC EXERCISES: CPT | Performed by: PHYSICAL THERAPIST

## 2018-09-07 NOTE — FLOWSHEET NOTE
Skateboard 1' each Fwd/back; side to side; circles        Belt DF Stretch 3 way x 60\" gentle        Standing HR/TR 15x    Lunge/Rocks 15x each    Sidesteps 4 laps    Step Ups 2\" 15x    Mini Squats 15x                   Ultrasound 8' 1.0MHz, 100%, 1.2w/cm2   [x] Provided verbal/tactile cueing for activities related to strengthening, flexibility, endurance, ROM. (93113)  [] Provided verbal/tactile cueing for activities related to improving balance, coordination, kinesthetic sense, posture, motor skill, proprioception. (27526)    Therapeutic Activities:     [] Therapeutic activities, direct (one-on-one) patient contact (use of dynamic activities to improve functional performance). (73316)    Gait:   [] Provided training and instruction to the patient for ambulation re-education. (92064)    Self-Care/ADL's  [] Self-care/home management training and compensatory training, meal preparation, safety procedures, and instructions in use of assistive technology devices/adaptive equipment, direct one-on-one contact. (24769)    Home Exercise Program:     [x] Reviewed/Progressed HEP activities related to strengthening, flexibility, endurance, ROM. (01209)  [] Reviewed/Progressed HEP activities related to improving balance, coordination, kinesthetic sense, posture, motor skill, proprioception.  (88862)    Manual Treatments:    [] Provided manual therapy to mobilize soft tissue/joints for the purpose of modulating pain, promoting relaxation,  increasing ROM, reducing/eliminating soft tissue swelling/inflammation/restriction, improving soft tissue extensibility.  (66448)    Service Based Modalities:  8' Ultrasound (no charge)    Timed Code Treatment Minutes:   36' there-ex/HEP    Total Treatment Minutes:   50'    Treatment/Activity Tolerance:  [x] Patient tolerated treatment well [] Patient limited by fatique  [] Patient limited by pain  [] Patient limited by other medical complications  [] Other:     Prognosis: [] Good [x]

## 2018-09-10 ENCOUNTER — HOSPITAL ENCOUNTER (OUTPATIENT)
Dept: PHYSICAL THERAPY | Age: 23
Setting detail: THERAPIES SERIES
Discharge: HOME | End: 2018-09-10
Payer: COMMERCIAL

## 2018-09-13 ENCOUNTER — HOSPITAL ENCOUNTER (OUTPATIENT)
Dept: PHYSICAL THERAPY | Age: 23
Setting detail: THERAPIES SERIES
Discharge: HOME | End: 2018-09-13
Payer: COMMERCIAL

## 2018-09-13 PROCEDURE — 97110 THERAPEUTIC EXERCISES: CPT | Performed by: PHYSICAL THERAPIST

## 2018-09-13 PROCEDURE — 97035 APP MDLTY 1+ULTRASOUND EA 15: CPT | Performed by: PHYSICAL THERAPIST

## 2018-09-13 NOTE — FLOWSHEET NOTE
Patient limited by fatique  [] Patient limited by pain  [] Patient limited by other medical complications  [] Other:     Prognosis: [] Good [x] Fair  [] Poor    Patient Requires Follow-up: [x] Yes  [] No      Goals:  Short term goals  Time Frame for Short term goals: 2 weeks  Short term goal 1: Initiate HEP  Short term goal 2: Decrease L ankle pain to <5/10 for improved ease with ADL and ambulation  Short term goal 3: Increase L ankle AROM dorsiflexion to 5 degrees for improved ease with ADL and ambulation    Long term goals  Time Frame for Long term goals : 4 weeks  Long term goal 1: Indep with HEP  Long term goal 2: Decrease L ankle pain to <3/10 for improved ease with ADL and ambulation  Long term goal 3: Increase L ankle AROM/strength  to [facility] for improved ease with ADL and ambulation  Long term goal 4: Pt to amb >500' and tolerate full work shift without CAM boot for improved ease and independence with home/community/work duties  Long term goal 5: LEFS score <20% disabled for return to previous level of function    Plan:   [x] Continue per plan of care [] Alter current plan (see comments)  [] Plan of care initiated [] Hold pending MD visit [] Discharge    Plan for Next Session:  Progress ROM and strength as tolerated. May begin slowly weaning out of CAM boot.     Electronically signed by:  Guy Zimmer DPT

## 2018-09-19 ENCOUNTER — HOSPITAL ENCOUNTER (OUTPATIENT)
Dept: PHYSICAL THERAPY | Age: 23
Setting detail: THERAPIES SERIES
Discharge: HOME | End: 2018-09-19
Payer: COMMERCIAL

## 2018-09-19 PROCEDURE — 97035 APP MDLTY 1+ULTRASOUND EA 15: CPT | Performed by: PHYSICAL THERAPIST

## 2018-09-19 PROCEDURE — 97110 THERAPEUTIC EXERCISES: CPT | Performed by: PHYSICAL THERAPIST

## 2018-09-19 NOTE — FLOWSHEET NOTE
Physical Therapy Daily Treatment Note    Date:  2018    Patient Name:  Loli Donaldson    :  1995  MRN: [id number]  Restrictions/Precautions:     Medical/Treatment Diagnosis Information:   · Diagnosis: Peroneal Tendonitis of L leg, Achilles tendonitis of L leg,   · Treatment Diagnosis: left ankle pain, weakness  Insurance/Certification information:  PT Insurance Information: Adina Santos  Physician Information:  Referring Practitioner: Gabriel Chopra DPM  Plan of care signed (Y/N):  Y  Visit# / total visits:  5/10  Pain level: 4-5/10     G-Code (if applicable):      Date G-Code Applied:  18  PT G-Codes  Functional Assessment Tool Used: LEFS  Score: 40/80 = 50% disabled  Functional Limitation: Mobility: Walking and moving around  Mobility: Walking and Moving Around Current Status (): At least 40 percent but less than 60 percent impaired, limited or restricted  Mobility: Walking and Moving Around Goal Status (): At least 1 percent but less than 20 percent impaired, limited or restricted    Time In: 11:03   Time Out: 12:01    Progress Note: []  Yes  [x]  No  Next due by: Visit #10 Or by 18     Subjective:   \"My ankle and foot feel very heavy, weighted, stiff feeling today. There isn't anything sharp feeling. I have gone 2 days at work without the boot. I still bring it with me just in case, but was able to get through 7 hours shifts without it. I noticed that I can usually get though about 4 hours at work before it gets somewhat sore. \"    Objective:   Observation:   Test measurements:    AROM L ankle   DF:15°   PF: 55°  INV: 30°  EV: 15°  Tandem Stance R foot behind: 30 seconds, Left behind: 22 seconds    Exercises: Increased reps/resistance, and progressed to more standing ex this date to build WBing tolerance and endurance out of boot.   Exercise/Equipment Resistance/Repetitions Other comments   Ankle 4 way HEP    Ankle Circles HEP    Ankle ABC 1x A-Z    Toe Curl/Ext HEP    Seated HR/TR 15x    Standing BAPS 15x each Level 3   Towel Swipes 15x    Towel Scrunches 3# 2x length of towel    Skateboard 1' each Fwd/back; side to side; circles        Belt DF Stretch 3 way x 60\" gentle        Standing HR/TR 15x    Lunge/Rocks 15x each On BOSU   Sidesteps 4 laps    Step Ups 8\" 15x AIREX on floor   Mini Squats 15x    SLS Ball Rolls 15x each bilat    AIREX 3 way hip 15x each bilat         Ultrasound 8' 1.0MHz, 100%, 1.2w/cm2   [x] Provided verbal/tactile cueing for activities related to strengthening, flexibility, endurance, ROM. (76510)  [] Provided verbal/tactile cueing for activities related to improving balance, coordination, kinesthetic sense, posture, motor skill, proprioception. (03476)    Therapeutic Activities:     [] Therapeutic activities, direct (one-on-one) patient contact (use of dynamic activities to improve functional performance). (17849)    Gait:   [] Provided training and instruction to the patient for ambulation re-education. (49595)    Self-Care/ADL's  [] Self-care/home management training and compensatory training, meal preparation, safety procedures, and instructions in use of assistive technology devices/adaptive equipment, direct one-on-one contact. (53490)    Home Exercise Program:     [x] Reviewed/Progressed HEP activities related to strengthening, flexibility, endurance, ROM. (15340)  [] Reviewed/Progressed HEP activities related to improving balance, coordination, kinesthetic sense, posture, motor skill, proprioception.  (67925)    Manual Treatments:    [] Provided manual therapy to mobilize soft tissue/joints for the purpose of modulating pain, promoting relaxation,  increasing ROM, reducing/eliminating soft tissue swelling/inflammation/restriction, improving soft tissue extensibility.  (24733)    Service Based Modalities:  8' Ultrasound (no charge)    Timed Code Treatment Minutes:   48' there-ex/HEP    Total Treatment Minutes:   62'    Treatment/Activity Tolerance:  [x]

## 2018-09-24 ENCOUNTER — APPOINTMENT (OUTPATIENT)
Dept: PHYSICAL THERAPY | Age: 23
End: 2018-09-24
Payer: COMMERCIAL

## 2018-09-26 ENCOUNTER — HOSPITAL ENCOUNTER (OUTPATIENT)
Dept: PHYSICAL THERAPY | Age: 23
Setting detail: THERAPIES SERIES
Discharge: HOME | End: 2018-09-26
Payer: COMMERCIAL

## 2018-09-26 PROCEDURE — 97035 APP MDLTY 1+ULTRASOUND EA 15: CPT | Performed by: PHYSICAL THERAPY ASSISTANT

## 2018-09-26 PROCEDURE — 97110 THERAPEUTIC EXERCISES: CPT | Performed by: PHYSICAL THERAPY ASSISTANT

## 2018-09-27 NOTE — PROGRESS NOTES
Physical Therapy  [facility]  Rehabilitation and Sports Medicine    [x] [place]  Phone: [phone]  Fax: [phone]      [] [place]  Phone: [phone]  Fax: [phone]    Physical Therapy Progress Note  Date: 2018        Patient Name:  Marah Covarrubias    :  1995  MRN: [id number]  Restrictions/Precautions:     Medical/Treatment Diagnosis Information:   · Diagnosis: Peroneal Tendonitis of L leg, Achilles tendonitis of L leg,   · Treatment Diagnosis: left ankle pain, weakness  Insurance/Certification information:  PT Insurance Information: [employer]  Physician Information:  Referring Practitioner: Carlene Mishra DPM  Plan of care signed (Y/N):  Y  Visit# / total visits:  5/10  Pain level:      4-5/10      G-Code (if applicable):      Date G-Code Applied:  18     PT G-Codes  Functional Assessment Tool Used: LEFS  Score: 40/80 = 50% disabled  Functional Limitation: Mobility: Walking and moving around  Mobility: Walking and Moving Around Current Status (): At least 20 percent but less than 40 percent impaired, limited or restricted  Mobility: Walking and Moving Around Goal Status (): At least 1 percent but less than 20 percent impaired, limited or restricted       Time Period for Report: 18 - 18   Cancels/No-shows to date:  3    Plan of Care/Treatment to date:  [x] Therapeutic Exercise    [x] Modalities:  [x] Therapeutic Activity     [x] Ultrasound  [] Electrical Stimulation  [x] Gait Training      [] Cervical Traction    [] Lumbar Traction  [x] Neuromuscular Re-education  [] Cold/hotpack [] Iontophoresis  [x] Instruction in HEP      Other:  [x] Manual Therapy       []    [] Aquatic Therapy       []                    ? Subjective:    Pain this date rated 4/10 through lateral ankle. Palpable tenderness remains. Difficulty with walking > 20min and standing for work related activities.           Objective:  ·   Observation: Gunderson Taping to left additional visits:      Requested frequency/duration:  2X/week for 4 weeks    Electronically signed by:  Shawnee Humphreys PT, DPT    If you have any questions or concerns, please don't hesitate to call.   Thank you for your referral.    Physician Signature:________________________________Date:__________________  By signing above, therapists plan is approved by physician

## 2018-10-01 ENCOUNTER — HOSPITAL ENCOUNTER (OUTPATIENT)
Dept: PHYSICAL THERAPY | Age: 23
Setting detail: THERAPIES SERIES
Discharge: HOME | End: 2018-10-01
Payer: COMMERCIAL

## 2018-10-04 ENCOUNTER — HOSPITAL ENCOUNTER (OUTPATIENT)
Dept: PHYSICAL THERAPY | Age: 23
Setting detail: THERAPIES SERIES
Discharge: HOME | End: 2018-10-04
Payer: COMMERCIAL

## 2018-10-04 PROCEDURE — 97110 THERAPEUTIC EXERCISES: CPT | Performed by: PHYSICAL THERAPIST

## 2018-10-04 PROCEDURE — 97140 MANUAL THERAPY 1/> REGIONS: CPT | Performed by: PHYSICAL THERAPIST

## 2018-10-04 NOTE — FLOWSHEET NOTE
3   Towel Swipes     Towel Scrunches     Skateboard 1' each Fwd/back; side to side; circles        Belt DF Stretch 3 way x 60\" gentle        Standing HR/TR 15x DL  10x SL bilat   Lunge/Rocks 15x each On BOSU (F,L)   Sidesteps 4 laps    Step Ups 8\" 15x AIREX on floor   Mini Squats 15x    SLS Ball Rolls 15x each bilat    AIREX 3 way hip 15x each bilat    SLS 3x15'' Initiated             Manual  15' STM/DTM, myofascial release, IDN   Ultrasound  1. 0MHz, 100%, 1.2w/cm2   [x] Provided verbal/tactile cueing for activities related to strengthening, flexibility, endurance, ROM. (43917)  [] Provided verbal/tactile cueing for activities related to improving balance, coordination, kinesthetic sense, posture, motor skill, proprioception. (86293)    Therapeutic Activities:     [] Therapeutic activities, direct (one-on-one) patient contact (use of dynamic activities to improve functional performance). (12084)    Gait:   [] Provided training and instruction to the patient for ambulation re-education. (47573)    Self-Care/ADL's  [] Self-care/home management training and compensatory training, meal preparation, safety procedures, and instructions in use of assistive technology devices/adaptive equipment, direct one-on-one contact. (05866)    Home Exercise Program:     [x] Reviewed/Progressed HEP activities related to strengthening, flexibility, endurance, ROM. (72987)  [] Reviewed/Progressed HEP activities related to improving balance, coordination, kinesthetic sense, posture, motor skill, proprioception.  (49367)    Manual Treatments:    [x] Provided manual therapy to mobilize soft tissue/joints for the purpose of modulating pain, promoting relaxation,  increasing ROM, reducing/eliminating soft tissue swelling/inflammation/restriction, improving soft tissue extensibility. (62594)   IDN added this date to help reduce inflammation, tone, spasm, trigger points, and increase circulation and tissue extensibility for decreased pain.

## 2018-10-09 ENCOUNTER — HOSPITAL ENCOUNTER (OUTPATIENT)
Dept: PHYSICAL THERAPY | Age: 23
Setting detail: THERAPIES SERIES
Discharge: HOME | End: 2018-10-09
Payer: COMMERCIAL

## 2018-10-12 ENCOUNTER — HOSPITAL ENCOUNTER (OUTPATIENT)
Dept: PHYSICAL THERAPY | Age: 23
Setting detail: THERAPIES SERIES
Discharge: HOME | End: 2018-10-12
Payer: COMMERCIAL

## 2018-10-15 ENCOUNTER — HOSPITAL ENCOUNTER (OUTPATIENT)
Dept: PHYSICAL THERAPY | Age: 23
Setting detail: THERAPIES SERIES
Discharge: HOME | End: 2018-10-15
Payer: COMMERCIAL

## 2018-10-15 PROCEDURE — 97150 GROUP THERAPEUTIC PROCEDURES: CPT | Performed by: PHYSICAL THERAPIST

## 2018-10-15 PROCEDURE — 97110 THERAPEUTIC EXERCISES: CPT | Performed by: PHYSICAL THERAPIST

## 2018-10-15 NOTE — FLOWSHEET NOTE
Standing BAPS 15x each Level 3   Towel Swipes     Towel Scrunches     Skateboard 1' each Fwd/back; side to side; circles        Belt DF Stretch 3 way x 60\" gentle        Standing HR/TR 15x DL  10x SL bilat   Lunge/Rocks 15x each On BOSU (F,L)   Sidesteps 4 laps    Step Ups 8\" 15x AIREX on floor   BOSU Side to side 10x each    Mini Squats 15x AIREX   SLS Ball Rolls 15x each bilat    AIREX 3 way hip 15x each bilat    SLS 3x30'' Initiated   3 way ball toss 3 x 30\"         Manual  15' STM/DTM, myofascial release, IDN   Ultrasound  1. 0MHz, 100%, 1.2w/cm2   [x] Provided verbal/tactile cueing for activities related to strengthening, flexibility, endurance, ROM. (63014)  [] Provided verbal/tactile cueing for activities related to improving balance, coordination, kinesthetic sense, posture, motor skill, proprioception. (42339)    Therapeutic Activities:     [] Therapeutic activities, direct (one-on-one) patient contact (use of dynamic activities to improve functional performance). (13702)    Gait:   [] Provided training and instruction to the patient for ambulation re-education. (38474)    Self-Care/ADL's  [] Self-care/home management training and compensatory training, meal preparation, safety procedures, and instructions in use of assistive technology devices/adaptive equipment, direct one-on-one contact. (44356)    Home Exercise Program:     [x] Reviewed/Progressed HEP activities related to strengthening, flexibility, endurance, ROM. (45279)  [] Reviewed/Progressed HEP activities related to improving balance, coordination, kinesthetic sense, posture, motor skill, proprioception.  (38766)    Manual Treatments:    [x] Provided manual therapy to mobilize soft tissue/joints for the purpose of modulating pain, promoting relaxation,  increasing ROM, reducing/eliminating soft tissue swelling/inflammation/restriction, improving soft tissue extensibility.  (25376)   IDN added this date to help reduce inflammation, tone, spasm,

## 2020-05-19 NOTE — FLOWSHEET NOTE
Date of Service: 05/18/2020    VIDEO VISIT    SUBJECTIVE:  The patient is a 33-year-old woman who states that her predominant problem is one of \"fainting.\"  The patient described 3 episodes, the first in 02/2019 when she states she was in the store, she became hot and followed then by an apparent syncopal episode as she slowly went to ground.  No significant head injury, tongue biting or incontinence was noted.  She states it lasted seconds and when she awoke she was cleared without residual except for some nausea.  She was seen the following day at [facility].  CT scan was reported as unremarkable.  She was given Zofran for nausea.    The next episode was approximately 07/2019 when she was at home sitting on the toilet urinating when her head felt \"off.\"  She was then found on floor by , \"pale/shaky,\" was able to get up and walk to bed.  Again, no clear movements, tongue biting or incontinence was appreciated.  The last episode was in approximately 09/2019 when again felt \"hot and woozy.\"  She was then able to sit down and although felt somewhat shaky, no loss or altered consciousness was noted  There is no clear history of automatisms, olfactory hallucinations or movements.  She specifically denies any chest pain or shortness of breath, palpitations.  Some post-event nausea without abdominal pain or diarrhea.  The patient does state that she does experience some orthostatic complaints if she gets up out of bed \"too quickly,\" which again leads to a similar complaint of a lightheaded feeling similar to those that have resulted in her syncopal events as noted.    The patient also has a history of chronic recurrent headaches of basically 3 types.  Type 1 \"migraine\" is a bifrontotemporal periorbital pressure throbbing headache associated with nausea, vomiting, photophobia and sonophobia.  These began while in high school.  At that time the frequency was approximately 1 every 3-4 months.  She  Physical Therapy Daily Treatment Note    Date:  2018    Patient Name:  Sierra Patel    :  1995  MRN: [id number]  Restrictions/Precautions:     Medical/Treatment Diagnosis Information:   · Diagnosis: Peroneal Tendonitis of L leg, Achilles tendonitis of L leg,   · Treatment Diagnosis: left ankle pain, weakness  Insurance/Certification information:  PT Insurance Information: [address]  Physician Information:  Referring Practitioner: José Antonio Santamaria DPM  Plan of care signed (Y/N):  Y  Visit# / total visits:  5/10  Pain level: 4-5/10     G-Code (if applicable):      Date G-Code Applied:  18  PT G-Codes  Functional Assessment Tool Used: LEFS  Score: 40/80 = 50% disabled  Functional Limitation: Mobility: Walking and moving around  Mobility: Walking and Moving Around Current Status (): At least 40 percent but less than 60 percent impaired, limited or restricted  Mobility: Walking and Moving Around Goal Status (): At least 1 percent but less than 20 percent impaired, limited or restricted    Time In: 11:03   Time Out: 1146    Progress Note: []  Yes  [x]  No  Next due by: Visit #10 Or by 18     Subjective:   Pain this date rated 4/10 through lateral ankle. Palpable tenderness remains. Difficulty with walking > 20min and standing for work related activities. Objective:  YESICA complete per flow chart to facilitate strength, motion, mobility and stability to allow ease with daily activities and ambulation. Verbal cuing for progression and technique with exercises. Difficulty with ankle stability remains. Observation: Gunderson Taping to left lateral ankle  Posterior pull on tape to allow tendon, ligament inhibition. Improvement in pain noted.      Test measurements:        Exercises:  Exercise/Equipment Resistance/Repetitions Other comments   Ankle 4 way HEP    Ankle Circles HEP    Ankle ABC 1x A-Z    Toe Curl/Ext HEP    Seated HR/TR     Standing BAPS 15x each Level 3   Towel Swipes Towel Scrunches     Skateboard 1' each Fwd/back; side to side; circles        Belt DF Stretch 3 way x 60\" gentle        Standing HR/TR 15x    Lunge/Rocks 15x each On BOSU (F,L)   Sidesteps 4 laps    Step Ups 8\" 15x AIREX on floor   Mini Squats 15x    SLS Ball Rolls 15x each bilat    AIREX 3 way hip 15x each bilat    SLS 3x15'' Initiated                  Ultrasound 8' 1.0MHz, 100%, 1.2w/cm2   [x] Provided verbal/tactile cueing for activities related to strengthening, flexibility, endurance, ROM. (85231)  [] Provided verbal/tactile cueing for activities related to improving balance, coordination, kinesthetic sense, posture, motor skill, proprioception. (61304)    Therapeutic Activities:     [] Therapeutic activities, direct (one-on-one) patient contact (use of dynamic activities to improve functional performance). (83688)    Gait:   [] Provided training and instruction to the patient for ambulation re-education. (26070)    Self-Care/ADL's  [] Self-care/home management training and compensatory training, meal preparation, safety procedures, and instructions in use of assistive technology devices/adaptive equipment, direct one-on-one contact. (29313)    Home Exercise Program:     [x] Reviewed/Progressed HEP activities related to strengthening, flexibility, endurance, ROM. (33445)  [] Reviewed/Progressed HEP activities related to improving balance, coordination, kinesthetic sense, posture, motor skill, proprioception.  (42907)    Manual Treatments:    [] Provided manual therapy to mobilize soft tissue/joints for the purpose of modulating pain, promoting relaxation,  increasing ROM, reducing/eliminating soft tissue swelling/inflammation/restriction, improving soft tissue extensibility.  (03691)    Service Based Modalities:      Timed Code Treatment Minutes:   28' there-ex/HEP       8' US    Total Treatment Minutes:   37    Treatment/Activity Tolerance:  [x] Patient tolerated treatment well [] Patient limited by feels over the past few years, the frequency has increased to about 1 every other month.  She denies specific precipitants including menses/food/alcohol/sleep deprivation/caffeine use or withdrawal.  The headaches may last up to 3-4 hours.  She tends to lay down in a dark room till it passes.  With some headaches she may notice a visual complaint \"orbs\" with some visual alteration.  These usually occur with the headache.  She denies any specific prodrome or aura.  She is well.  Denies any clear correlation of headaches with the previously noted syncopal events.  The patient also describes a second headache as a holocephalic pressure throbbing, mild nausea without vomiting.  These were first noted in 05/2009 following her fainting episode.  She has had approximately 2-3 of these events.  Headache type 3 is predominantly nuchal/occipital, pressure tightening in nature, occurs \"rarely.\"    Rest of the past medical history is unremarkable.  She was hospitalized in 2017 for a gastric sleeve for weight reduction.    SOCIAL HISTORY:  The patient works as a , often taking the train to work.  She does not smoke.  She drinks occasionally.  No illicit drug use.    FAMILY HISTORY:  Mother, father, 2 sisters, 1 brother alive and well.  She has no children.  No clear neurologic history of either migraine or syncopal events are appreciated.  The patient denies any CNS injury or infection.    IMPRESSION:  The patient has a history of apparent recurrent syncopal episodes secondary to a transient cerebral hypoperfusion.  Some of vagal effect with post-nausea was noted.  No clear trigger could be elicited.  The second episode, although she was urinating, apparently occurred while she was still sitting, though possibility of micturition syncope was discussed.  No other history of rhythm alteration, chest pain/palpitations was noted.  The patient's headache appeared to be that of migraine without clear prodrome or aura.  These  angel  [] Patient limited by pain  [] Patient limited by other medical complications  [] Other:     Prognosis: [] Good [x] Fair  [] Poor    Patient Requires Follow-up: [x] Yes  [] No      Goals:  Short term goals  Time Frame for Short term goals: 2 weeks  Short term goal 1: Initiate HEP  Short term goal 2: Decrease L ankle pain to <5/10 for improved ease with ADL and ambulation  Short term goal 3: Increase L ankle AROM dorsiflexion to 5 degrees for improved ease with ADL and ambulation    Long term goals  Time Frame for Long term goals : 4 weeks  Long term goal 1: Indep with HEP  Long term goal 2: Decrease L ankle pain to <3/10 for improved ease with ADL and ambulation  Long term goal 3: Increase L ankle AROM/strength  to [facility] for improved ease with ADL and ambulation  Long term goal 4: Pt to amb >500' and tolerate full work shift without CAM boot for improved ease and independence with home/community/work duties  Long term goal 5: LEFS score <20% disabled for return to previous level of function    Plan:   [x] Continue per plan of care [] Alter current plan (see comments)  [] Plan of care initiated [] Hold pending MD visit [] Discharge    Plan for Next Session:  Continue to slowly progress and advance as able. Electronically signed by:   Cristina Bowman began in high school.  She was tried on Sumatriptan 50 mg (did not repeat or increase dose) and was of apparently little help.  The patient is on Wellbutrin- mg as well as Zoloft 100 mg at night, which she takes for \"anxiety/depression,\" which may be related to a transient hypoperfusion.    RECOMMENDATION:  I will obtain an MRI scan of brain (without contrast), as well as a Holter monitor, possibly  a rhythm disturbance.  She will be in further discussion with her psychiatrist about possibly reducing dose or changing her psychotropic medication, which again may be playing some role in her recurrent syncopal events/including her mild orthostatic complaints as previously noted.  She will call me after above-mentioned studies.  Further recommendations for possible change in either her abortive agent to include increasing her dose to 100 mg or repeating it at 2 hours or changing to a longer acting triptan (Frova or Amerge) would be discussed.  The possible use of a CGRP antagonist oral agent as an abortive agent would also be a possibility.  I discussed with her as well the possible use of prophylactic therapy, which include nonselective beta blocker/calcium channel blocker/tricyclic antidepressants (Amitriptyline)/anticonvulsant (predominantly Topiramate/Valproic acid), all can be discussed at a later time.    She will call me in several weeks to keep me informed of her progress.  I will reevaluate her in approximately 3 months or sooner if there is any change in her neurologic status.      Dictated By: Anil Toure MD  Signing Provider: Anil Toure MD    MS/vls ([id number])  DD: 05/18/2020 17:59:41 TD: 05/18/2020 20:38:00    Copy Sent To: